# Patient Record
Sex: MALE | Race: WHITE
[De-identification: names, ages, dates, MRNs, and addresses within clinical notes are randomized per-mention and may not be internally consistent; named-entity substitution may affect disease eponyms.]

---

## 2020-09-01 ENCOUNTER — HOSPITAL ENCOUNTER (EMERGENCY)
Dept: HOSPITAL 54 - ER | Age: 25
Discharge: HOME | End: 2020-09-01
Payer: SELF-PAY

## 2020-09-01 VITALS — DIASTOLIC BLOOD PRESSURE: 71 MMHG | SYSTOLIC BLOOD PRESSURE: 150 MMHG

## 2020-09-01 VITALS — WEIGHT: 225 LBS | BODY MASS INDEX: 27.4 KG/M2 | HEIGHT: 76 IN

## 2020-09-01 DIAGNOSIS — G47.00: Primary | ICD-10-CM
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
## 2022-09-12 ENCOUNTER — HOSPITAL ENCOUNTER (EMERGENCY)
Dept: HOSPITAL 54 - ER | Age: 27
Discharge: LEFT BEFORE DISCHARGE | End: 2022-09-12
Payer: SELF-PAY

## 2022-09-12 VITALS — WEIGHT: 210 LBS | HEIGHT: 72 IN | BODY MASS INDEX: 28.44 KG/M2

## 2022-09-12 VITALS — SYSTOLIC BLOOD PRESSURE: 126 MMHG | DIASTOLIC BLOOD PRESSURE: 68 MMHG

## 2022-09-12 DIAGNOSIS — R10.13: ICD-10-CM

## 2022-09-12 DIAGNOSIS — R11.2: Primary | ICD-10-CM

## 2022-09-12 NOTE — NUR
Patient does not wish to proceed with medical care recommended by 
DESI ROONEY NP. Patient given information related to possible 
complications, up to and including death, which could occur as a result of 
leaving the hospital at this time. Patient verbalizes understanding of risks 
involved due to leaving against medical advice. Patient has signed AMA form.